# Patient Record
Sex: MALE | NOT HISPANIC OR LATINO | Employment: FULL TIME | URBAN - METROPOLITAN AREA
[De-identification: names, ages, dates, MRNs, and addresses within clinical notes are randomized per-mention and may not be internally consistent; named-entity substitution may affect disease eponyms.]

---

## 2021-03-25 DIAGNOSIS — Z23 ENCOUNTER FOR IMMUNIZATION: ICD-10-CM

## 2023-06-27 PROBLEM — F33.0 MILD EPISODE OF RECURRENT MAJOR DEPRESSIVE DISORDER (HCC): Status: ACTIVE | Noted: 2022-10-06

## 2023-06-27 PROBLEM — F41.1 GAD (GENERALIZED ANXIETY DISORDER): Status: ACTIVE | Noted: 2022-10-06

## 2025-06-12 ENCOUNTER — TELEPHONE (OUTPATIENT)
Age: 40
End: 2025-06-12

## 2025-06-12 NOTE — TELEPHONE ENCOUNTER
I received a Care Request from patient to schedule for:    First Name: Michael  Last Name: Jovany  Email: [email or web address]  Phone: [phone]  Subject: Request an appointment  Where does it hurt? Hip pain      I lvm to [facility] at [phone].

## 2025-06-16 ENCOUNTER — OFFICE VISIT (OUTPATIENT)
Age: 40
End: 2025-06-16
Payer: COMMERCIAL

## 2025-06-16 ENCOUNTER — APPOINTMENT (OUTPATIENT)
Age: 40
End: 2025-06-16
Attending: ORTHOPAEDIC SURGERY
Payer: COMMERCIAL

## 2025-06-16 DIAGNOSIS — M25.552 LEFT HIP PAIN: ICD-10-CM

## 2025-06-16 DIAGNOSIS — M25.551 RIGHT HIP PAIN: ICD-10-CM

## 2025-06-16 DIAGNOSIS — M16.11 ARTHRITIS OF RIGHT HIP: Primary | ICD-10-CM

## 2025-06-16 PROCEDURE — 99204 OFFICE O/P NEW MOD 45 MIN: CPT | Performed by: ORTHOPAEDIC SURGERY

## 2025-06-16 PROCEDURE — 73521 X-RAY EXAM HIPS BI 2 VIEWS: CPT

## 2025-06-16 RX ORDER — DICLOFENAC SODIUM 75 MG/1
75 TABLET, DELAYED RELEASE ORAL 2 TIMES DAILY
Qty: 40 TABLET | Refills: 0 | Status: SHIPPED | OUTPATIENT
Start: 2025-06-16

## 2025-06-16 NOTE — LETTER
June 16, 2025     Patient: Michael Manzo  YOB: 1985  Date of Visit: 6/16/2025      To Whom it May Concern:    Michael Manzo is under my professional care. Michael was seen in my office on 6/16/2025. Michael can continue work, should be allowed more comfortable shoes due to his hip pain, sneakers/running shoes. I also recommend breaks every 2 hours for 15 minutes to rest the hip if needed. These restrictions should remain in place through 9/30/25 since he will be returning early August to work.     If you have any questions or concerns, please don't hesitate to call.         Sincerely,          Luis Santiago MD        CC: No Recipients

## 2025-06-16 NOTE — PROGRESS NOTES
:  Assessment & Plan  Right hip pain         Arthritis of right hip    Orders:    Ambulatory Referral to Physical Therapy; Future    diclofenac (VOLTAREN) 75 mg EC tablet; Take 1 tablet (75 mg total) by mouth 2 (two) times a day    RIGHT worse than left hip pain and arthritis   Discussed treatment options  We reviewed the imaging details, does have moderate arthritis   He is taking ibuprofen with some relief    PT script   Diclofenac script, can try in place of ibuprofen  Follow up 8 weeks, we discussed return to work modifications at that time, he is out of work for summer vacation             REASON FOR VISIT:  Chief Complaint   Patient presents with    Right Hip - Pain    Left Hip - Pain         HISTORY OF PRESENT ILLNESS:  RIGHT worse than left hip pain      BILATERAL hip pain for several years, off and on       Then 2 weeks ago at work, he fell and landed on the RIGHT side  Felt sharp RIGHT hip pain      He walks a lot at work    Pain located mostly anterior RIGHT hip  Worse with external rotation   No groin pain but pain mainly anterior hip joint     Was on crutches initially     He walks a lot at work, and pain is mostly after walking    He tried ibuprofen with some benefit          Past Medical History[1]     Past Surgical History[2]    Social History     Socioeconomic History    Marital status: /Civil Union     Spouse name: Not on file    Number of children: Not on file    Years of education: Not on file    Highest education level: Not on file   Occupational History    Occupation: teacher   Tobacco Use    Smoking status: Former     Current packs/day: 0.00     Types: Cigarettes     Start date:      Quit date:      Years since quittin.4    Smokeless tobacco: Never   Substance and Sexual Activity    Alcohol use: Not Currently    Drug use: Not on file    Sexual activity: Not on file   Other Topics Concern    Not on file   Social History Narrative    Not on file     Social Drivers of Health      Financial Resource Strain: Low Risk  (5/18/2023)    Received from [facility]    Overall Financial Resource Strain (CARDIA)     Difficulty of Paying Living Expenses: Not very hard   Food Insecurity: Food Insecurity Present (5/18/2023)    Received from [facility]    Hunger Vital Sign     Within the past 12 months, you worried that your food would run out before you got the money to buy more.: Sometimes true     Within the past 12 months, the food you bought just didn't last and you didn't have money to get more.: Never true   Transportation Needs: No Transportation Needs (5/18/2023)    Received from [facility]    PRAPARE - Transportation     Lack of Transportation (Medical): No     Lack of Transportation (Non-Medical): No   Physical Activity: Not on file   Stress: Not on file   Social Connections: Moderately Isolated (5/18/2023)    Received from [facility]    Social Connection and Isolation Panel     In a typical week, how many times do you talk on the phone with family, friends, or neighbors?: Once a week     How often do you get together with friends or relatives?: Never     How often do you attend Druze or Restorationist services?: Never     Do you belong to any clubs or organizations such as Druze groups, unions, fraternal or athletic groups, or school groups?: Yes     How often do you attend meetings of the clubs or organizations you belong to?: 1 to 4 times per year     Are you , , , , never , or living with a partner?:    Intimate Partner Violence: Not on file   Housing Stability: Low Risk  (5/18/2023)    Received from [facility]    Housing Stability Vital Sign     Unable to Pay for Housing in the Last Year: No     Number of Places Lived in the Last Year: 1     Unstable Housing in the Last Year: No         MEDICATIONS:  Current  Medications[3]    ALLERGIES:  Allergies[4]      MAJOR FINDINGS:  Michael Manzo is pleasant, healthy appearing, and in no acute distress. Appropriate mood, affect  Circulatory: extremities wwp    RIGHT hip  FF 70   ER 20   IR 0  + pain with FADIR   Nontender over GT bursa   Sensation intact sp/dp/t  Strength intact 5/5 hip adductors/hip abductors/hip flexors   SLR intact  Extremity wwp      LEFT hip  FF 90   ER 20   IR 10  No pain with FADIR   Nontender over GT bursa   Sensation intact sp/dp/t  Strength intact 5/5 hip adductors/hip abductors/hip flexors   SLR intact  Extremity wwp    IMAGING  I personally reviewed and interpreted the imaging studies  X-rays performed on the BILATERAL hip show moderate arthritis worse on the RIGHT      CC:  LAITH TORRES Self, Referral         [1] No past medical history on file.  [2] No past surgical history on file.  [3]   Current Outpatient Medications:     Acetaminophen (TYLENOL PO), Take by mouth if needed, Disp: , Rfl:     ibuprofen (MOTRIN) 200 mg tablet, Take by mouth every 6 (six) hours as needed for mild pain, Disp: , Rfl:     Psyllium (METAMUCIL PO), Take by mouth if needed, Disp: , Rfl:     sodium picosulfate, magnesium oxide, citric acid (Clenpiq) oral solution, Take 175 mL by mouth see administration instructions Follow instructions given at office, Disp: 350 mL, Rfl: 0  [4] No Known Allergies

## 2025-06-23 NOTE — PROGRESS NOTES
PT Evaluation     Today's date: 2025  Patient name: Michael Manzo  : 1985  MRN: [id number]  Referring provider: Luis Santiago MD  Dx:   Encounter Diagnosis     ICD-10-CM    1. Acute pain of right hip  M25.551       2. Arthritis of right hip  M16.11 Ambulatory Referral to Physical Therapy          Start Time: 1215  Stop Time: 1300  Total time in clinic (min): 45 minutes    Assessment  Impairments: abnormal coordination, abnormal muscle firing, abnormal muscle tone, abnormal or restricted ROM, abnormal movement, activity intolerance, impaired physical strength, lacks appropriate home exercise program, pain with function, participation limitations, activity limitations and endurance  Symptom irritability: moderate    Assessment details: Michael Manzo is a pleasant 39 y.o. male who presents with right hip pain secondary to suspected diagnosis of right hip OA and 2nd to fall he sustained down he steps about 3 weeks ago. He currently demonstrates limitations with right hip ROM, NM control/endurance, balance, pain, and coordination. Functional impairments include difficulty with ADL's and running that he enjoys to do as a hobby. Educated patient on structural vs muscular impairments and different ways PT can address each. Will focus a lot on muscular and joint tightness, NM activation of hips and core muscles, and progressing towards return to running.  Based on listed impairments, Michael Manzo would benefit from formal physical therapy to address impairments as detailed, decrease pain, and restore maximal level of function for all home, work, and mobility tasks. All patient questions and concerns have been addressed at this time. Thank you for this referral.  Understanding of Dx/Px/POC: good     Prognosis: good    Goals  Short Term Goals:   1. Patient will be independent with initial customized HEP in 1 week  2. Patient will demonstrate improvement in R LE MMT scores > 4+/5 in 2-4 weeks.  3.  Patient will report a decrease in their symptoms by at least 2 points on VAS with in 2-4 weeks.  4. Patient will demonstrate improved SLS > 15sec in 2-4 weeks  5. Patient will report improved ability to navigate 2 flights of steps in 2-4 weeks    Long Term Goals:   1. Patient will improve FOTO to greater than goal of target goal in 6-8 weeks  2. Patient will eliminiate pain with activity in 6-8 weeks  3. Patient will continue with HEP independence to allow for decreased future reoccurrence of pain and loss in function in 6-8 weeks  4. Patient will demonstrate improvement with 30sec chair rise to > 20 in 6-8 weeks.   5. Patient will report ability to run > 1 mile with no issues by discharge.    Plan  Patient would benefit from: skilled physical therapy and PT eval  Planned modality interventions: low level laser therapy, cryotherapy, electrical stimulation/Russian stimulation, TENS and thermotherapy: hydrocollator packs    Planned therapy interventions: IASTM, joint mobilization, kinesiology taping, massage, manual therapy, Clifford taping, balance/weight bearing training, body mechanics training, neuromuscular re-education, nerve gliding, coordination, strengthening, stretching, therapeutic activities, therapeutic exercise, home exercise program, graded exercise and graded motor    Frequency: 1-2x week  Duration in weeks: 8  Plan of Care beginning date: 6/24/2025  Plan of Care expiration date: 8/19/2025  Treatment plan discussed with: patient  Plan details: Prognosis above is given PT services 2x/week tapering to 1x/week over the next 6-8 weeks and home program adherence.        Subjective Evaluation    History of Present Illness  Mechanism of injury: Patient is 39 y.o. male that presents to PT with CC of right hip pain after falling 2xs about 3 weeks ago. Reports that he has been feeling with bl hip pain R>L for the last 3 years and used to be an avid runner. Reports that he has not been able to run a lot since  "then. Patient reports the pain is reproduced with bringing his knee to his chest and having to put his shoes and socks on. Reports that he does get pain when he lays down at night, but improves after 10min. Reports that he went to ER after falling and got x-rays which showed no fractures but moderate arthritis on BL hips.. Patient's goals from PT include to reduce his pain and return to running again.        Quality of life: good    Patient Goals  Patient goals for therapy: decreased pain, improved balance, increased motion, increased strength, independence with ADLs/IADLs and return to sport/leisure activities    Pain  Current pain ratin  At best pain rating: 3  At worst pain ratin  Relieving factors: rest, support and medications  Aggravating factors: sitting, walking, lifting and stair climbing  Progression: worsening    Social Support  Steps to enter house: yes  Stairs in house: yes   Lives in: multiple-level home  Lives with: spouse and young children    Employment status: working (Summer break \"teacher\")    Diagnostic Tests  X-ray: abnormal        Objective     Tenderness     Left Hip   Tenderness in the ASIS.     Right Hip   Tenderness in the ASIS.     Neurological Testing     Sensation     Hip   Left Hip   Intact: light touch    Right Hip   Intact: light touch    Reflexes   Left   Patellar (L4): normal (2+)  Achilles (S1): normal (2+)    Right   Patellar (L4): normal (2+)  Achilles (S1): normal (2+)    Active Range of Motion   Left Hip   Flexion: 90 degrees with pain  External rotation (90/90): 40 degrees with pain  Internal rotation (prone): 10 degrees with pain    Right Hip   Flexion: 40 degrees with pain  External rotation (90/90): 35 degrees with pain  Internal rotation (prone): 5 degrees with pain    Passive Range of Motion   Left Hip   Flexion: 100 degrees with pain  External rotation (prone): 60 degrees with pain  Internal rotation (prone): 20 and prone pain degrees with pain    Right Hip " "  Flexion: 45 degrees with pain  External rotation (prone): 50 degrees with pain  Internal rotation (prone): 10 degrees with pain    Strength/Myotome Testing     Left Hip   Planes of Motion   Flexion: 4  Extension: 4-  Abduction: 4  Adduction: 4-  External rotation: 4-  Internal rotation: 4-    Isolated Muscles   Gluteus jewell: 4-  Gluteus medius: 4    Right Hip   Planes of Motion   Flexion: 4-  Extension: 4-  Abduction: 4  Adduction: 4-  External rotation: 4-  Internal rotation: 4-    Isolated Muscles   Gluteus maximums: 4-  Gluteus medius: 4    Tests     Left Hip   Positive REYMUNDO, FADIR and scour.     Right Hip   Positive REYMUNDO, FADIR and scour.     Functional Assessment      Squat    Trunk lean left and sitting toward left side.       Flowsheet Rows      Flowsheet Row Most Recent Value   PT/OT G-Codes    Current Score 47   Projected Score 67               Precautions: na  Manuals IE 6/23                     HIP ROM IR and ER in prone                      Hip mobs nv                     STM  Piriformis, QL                                             Neuro Re-Ed                       Clam 3x10  BTB ea                     STS 3x10 cues for weight shift                      MET 2x10x5\" hold                                                                                                                     Ther Ex                       Upright bike nv            SL leg press             Heel slides                      Prone extension 3x10 KE and KF ea                     Gastroc stretch                       Figure 4 stretch Nv to tolerance                      Adductor stretch nv  Kneeling on foam                     HF march  nv                                                                    Ther Activity                                                                       Gait Training                                                                       Modalities                                             "

## 2025-06-24 ENCOUNTER — EVALUATION (OUTPATIENT)
Dept: PHYSICAL THERAPY | Facility: CLINIC | Age: 40
End: 2025-06-24
Attending: ORTHOPAEDIC SURGERY
Payer: COMMERCIAL

## 2025-06-24 DIAGNOSIS — M16.11 ARTHRITIS OF RIGHT HIP: ICD-10-CM

## 2025-06-24 DIAGNOSIS — M25.551 ACUTE PAIN OF RIGHT HIP: Primary | ICD-10-CM

## 2025-06-24 PROCEDURE — 97110 THERAPEUTIC EXERCISES: CPT

## 2025-06-24 PROCEDURE — 97161 PT EVAL LOW COMPLEX 20 MIN: CPT

## 2025-06-24 NOTE — LETTER
2025    Luis Santiago MD  [address]  [place] [address]    Patient: Michael Manzo   YOB: 1985   Date of Visit: 2025     Encounter Diagnosis     ICD-10-CM    1. Acute pain of right hip  M25.551       2. Arthritis of right hip  M16.11 Ambulatory Referral to Physical Therapy          Dear Dr. Luis Santiago MD:    Thank you for your recent referral of Michael Manzo. Please review the attached evaluation summary from Michael's recent visit.     Please verify that you agree with the plan of care by signing the attached order.     If you have any questions or concerns, please do not hesitate to call.     I sincerely appreciate the opportunity to share in the care of one of your patients and hope to have another opportunity to work with you in the near future.       Sincerely,    Evin Shafer, PT      Referring Provider:      I certify that I have read the below Plan of Care and certify the need for these services furnished under this plan of treatment while under my care.                    Luis Santiago MD  [address]   [facility] [address]  [place] [facility] [address]  Via In Basket          PT Evaluation     Today's date: 2025  Patient name: Michael Manzo  : 1985  MRN: [id number]  Referring provider: Luis Santiago MD  Dx:   Encounter Diagnosis     ICD-10-CM    1. Acute pain of right hip  M25.551       2. Arthritis of right hip  M16.11 Ambulatory Referral to Physical Therapy          Start Time: 1215  Stop Time: 1300  Total time in clinic (min): 45 minutes    Assessment  Impairments: abnormal coordination, abnormal muscle firing, abnormal muscle tone, abnormal or restricted ROM, abnormal movement, activity intolerance, impaired physical strength, lacks appropriate home exercise program, pain with function, participation limitations, activity limitations and endurance  Symptom irritability: moderate    Assessment details: Michael Manzo is a pleasant 39  y.o. male who presents with right hip pain secondary to suspected diagnosis of right hip OA and 2nd to fall he sustained down he steps about 3 weeks ago. He currently demonstrates limitations with right hip ROM, NM control/endurance, balance, pain, and coordination. Functional impairments include difficulty with ADL's and running that he enjoys to do as a hobby. Educated patient on structural vs muscular impairments and different ways PT can address each. Will focus a lot on muscular and joint tightness, NM activation of hips and core muscles, and progressing towards return to running.  Based on listed impairments, Michael Manzo would benefit from formal physical therapy to address impairments as detailed, decrease pain, and restore maximal level of function for all home, work, and mobility tasks. All patient questions and concerns have been addressed at this time. Thank you for this referral.  Understanding of Dx/Px/POC: good     Prognosis: good    Goals  Short Term Goals:   1. Patient will be independent with initial customized HEP in 1 week  2. Patient will demonstrate improvement in R LE MMT scores > 4+/5 in 2-4 weeks.  3. Patient will report a decrease in their symptoms by at least 2 points on VAS with in 2-4 weeks.  4. Patient will demonstrate improved SLS > 15sec in 2-4 weeks  5. Patient will report improved ability to navigate 2 flights of steps in 2-4 weeks    Long Term Goals:   1. Patient will improve FOTO to greater than goal of target goal in 6-8 weeks  2. Patient will eliminiate pain with activity in 6-8 weeks  3. Patient will continue with HEP independence to allow for decreased future reoccurrence of pain and loss in function in 6-8 weeks  4. Patient will demonstrate improvement with 30sec chair rise to > 20 in 6-8 weeks.   5. Patient will report ability to run > 1 mile with no issues by discharge.    Plan  Patient would benefit from: skilled physical therapy and PT eval  Planned modality  interventions: low level laser therapy, cryotherapy, electrical stimulation/Russian stimulation, TENS and thermotherapy: hydrocollator packs    Planned therapy interventions: IASTM, joint mobilization, kinesiology taping, massage, manual therapy, Clifford taping, balance/weight bearing training, body mechanics training, neuromuscular re-education, nerve gliding, coordination, strengthening, stretching, therapeutic activities, therapeutic exercise, home exercise program, graded exercise and graded motor    Frequency: 1-2x week  Duration in weeks: 8  Plan of Care beginning date: 2025  Plan of Care expiration date: 2025  Treatment plan discussed with: patient  Plan details: Prognosis above is given PT services 2x/week tapering to 1x/week over the next 6-8 weeks and home program adherence.        Subjective Evaluation    History of Present Illness  Mechanism of injury: Patient is 39 y.o. male that presents to PT with CC of right hip pain after falling 2xs about 3 weeks ago. Reports that he has been feeling with bl hip pain R>L for the last 3 years and used to be an avid runner. Reports that he has not been able to run a lot since then. Patient reports the pain is reproduced with bringing his knee to his chest and having to put his shoes and socks on. Reports that he does get pain when he lays down at night, but improves after 10min. Reports that he went to ER after falling and got x-rays which showed no fractures but moderate arthritis on BL hips.. Patient's goals from PT include to reduce his pain and return to running again.        Quality of life: good    Patient Goals  Patient goals for therapy: decreased pain, improved balance, increased motion, increased strength, independence with ADLs/IADLs and return to sport/leisure activities    Pain  Current pain ratin  At best pain rating: 3  At worst pain ratin  Relieving factors: rest, support and medications  Aggravating factors: sitting, walking,  "lifting and stair climbing  Progression: worsening    Social Support  Steps to enter house: yes  Stairs in house: yes   Lives in: multiple-level home  Lives with: spouse and young children    Employment status: working (Summer break \"teacher\")    Diagnostic Tests  X-ray: abnormal        Objective     Tenderness     Left Hip   Tenderness in the ASIS.     Right Hip   Tenderness in the ASIS.     Neurological Testing     Sensation     Hip   Left Hip   Intact: light touch    Right Hip   Intact: light touch    Reflexes   Left   Patellar (L4): normal (2+)  Achilles (S1): normal (2+)    Right   Patellar (L4): normal (2+)  Achilles (S1): normal (2+)    Active Range of Motion   Left Hip   Flexion: 90 degrees with pain  External rotation (90/90): 40 degrees with pain  Internal rotation (prone): 10 degrees with pain    Right Hip   Flexion: 40 degrees with pain  External rotation (90/90): 35 degrees with pain  Internal rotation (prone): 5 degrees with pain    Passive Range of Motion   Left Hip   Flexion: 100 degrees with pain  External rotation (prone): 60 degrees with pain  Internal rotation (prone): 20 and prone pain degrees with pain    Right Hip   Flexion: 45 degrees with pain  External rotation (prone): 50 degrees with pain  Internal rotation (prone): 10 degrees with pain    Strength/Myotome Testing     Left Hip   Planes of Motion   Flexion: 4  Extension: 4-  Abduction: 4  Adduction: 4-  External rotation: 4-  Internal rotation: 4-    Isolated Muscles   Gluteus jewell: 4-  Gluteus medius: 4    Right Hip   Planes of Motion   Flexion: 4-  Extension: 4-  Abduction: 4  Adduction: 4-  External rotation: 4-  Internal rotation: 4-    Isolated Muscles   Gluteus maximums: 4-  Gluteus medius: 4    Tests     Left Hip   Positive REYMUNDO, FADIR and scour.     Right Hip   Positive REYMUNDO, FADIR and scour.     Functional Assessment      Squat    Trunk lean left and sitting toward left side.       Flowsheet Rows      Flowsheet Row Most " "Recent Value   PT/OT G-Codes    Current Score 47   Projected Score 67               Precautions: na  Manuals IE 6/23                     HIP ROM IR and ER in prone                      Hip mobs nv                     STM  Piriformis, QL                                             Neuro Re-Ed                       Clam 3x10  BTB ea                     STS 3x10 cues for weight shift                      MET 2x10x5\" hold                                                                                                                     Ther Ex                       Upright bike nv            SL leg press             Heel slides                      Prone extension 3x10 KE and KF ea                     Gastroc stretch                       Figure 4 stretch Nv to tolerance                      Adductor stretch nv  Kneeling on foam                     HF march  nv                                                                    Ther Activity                                                                       Gait Training                                                                       Modalities                                                                                                "

## 2025-06-24 NOTE — HOME EXERCISE EDUCATION
Program_ID:[id number]   Access Code: [email or web address]  URL: [email or web address]  Date: 06-  Prepared By: Evin Shafer    Program Notes      Exercises      - Prone Hip Extension - 1 x daily - 7 x weekly - 3 sets - 10 reps      - Prone Hip Extension with Bent Knee - 1 x daily - 7 x weekly - 3 sets - 10 reps      - Clamshell with Resistance - 1 x daily - 7 x weekly - 3 sets - 10 reps      - Supine SI Joint Self-Correction - 1 x daily - 7 x weekly - 2 sets - 10 reps - 5sec hold      - Squat with Chair Touch - 1 x daily - 7 x weekly - 3 sets - 10 reps

## 2025-06-26 ENCOUNTER — APPOINTMENT (OUTPATIENT)
Dept: PHYSICAL THERAPY | Facility: CLINIC | Age: 40
End: 2025-06-26
Attending: ORTHOPAEDIC SURGERY
Payer: COMMERCIAL

## 2025-06-30 NOTE — PROGRESS NOTES
"Daily Note     Today's date: 2025  Patient name: Michael Manzo  : 1985  MRN: [id number]  Referring provider: Luis Santiago MD  Dx:   Encounter Diagnosis     ICD-10-CM    1. Arthritis of right hip  M16.11       2. Acute pain of right hip  M25.551           Start Time: 1030  Stop Time: 1116  Total time in clinic (min): 46 minutes    Subjective: Patient reports that he has been working on HEP. Reports that he did try to run and had some discomfort R>L but nothing that limited him.       Objective: See treatment diary below      Assessment: Tolerated treatment well. Responded well to mobilization, LAD and HF stretching. Continues to have the most limitation into flexion ROM R>L. Challenged with SL stability and marching today. demonstrated fatigue post treatment, exhibited good technique with therapeutic exercises, and would benefit from continued PT      Plan: Continue per plan of care.      Precautions: na  Manuals IE                    HIP ROM IR and ER in prone  IR and ER in prone                    Hip mobs nv  Lat/inf   Gr3-Gr4                    LAD  HB           STM  Piriformis, QL  Theragun  1900hz    Piriformis, QL, ERors                                           Neuro Re-Ed                       Clam 3x10  BTB ea  Standing  2x10 BTB                    STS 3x10 cues for weight shift   HEP                   MET 2x10x5\" hold  HEP                   SL bridge   2x8 ea                    Marching    3x10  #20kb                   KB march                                               Ther Ex                       TM nv 5' bloodflow           SL leg press             HF stretch  Half kneel   3x30\"                     Prone extension 3x10 KE and KF ea  HEP                   Gastroc stretch                       Figure 4 stretch Nv to tolerance                      Adductor stretch nv  Kneeling on foam                     HF march   nv                                                           "        Ther Activity                                                                       Gait Training                                                                       Modalities

## 2025-07-01 ENCOUNTER — OFFICE VISIT (OUTPATIENT)
Dept: PHYSICAL THERAPY | Facility: CLINIC | Age: 40
End: 2025-07-01
Attending: ORTHOPAEDIC SURGERY
Payer: COMMERCIAL

## 2025-07-01 DIAGNOSIS — M16.11 ARTHRITIS OF RIGHT HIP: Primary | ICD-10-CM

## 2025-07-01 DIAGNOSIS — M25.551 ACUTE PAIN OF RIGHT HIP: ICD-10-CM

## 2025-07-01 PROCEDURE — 97140 MANUAL THERAPY 1/> REGIONS: CPT

## 2025-07-01 PROCEDURE — 97110 THERAPEUTIC EXERCISES: CPT

## 2025-07-01 PROCEDURE — 97112 NEUROMUSCULAR REEDUCATION: CPT

## 2025-07-01 NOTE — HOME EXERCISE EDUCATION
Program_ID:[id number]   Access Code: [email or web address]  URL: [email or web address]  Date: 07-  Prepared By: Evin Shafer    Program Notes      Exercises      - Prone Hip Extension - 1 x daily - 7 x weekly - 3 sets - 10 reps      - Prone Hip Extension with Bent Knee - 1 x daily - 7 x weekly - 3 sets - 10 reps      - Clamshell with Resistance - 1 x daily - 7 x weekly - 3 sets - 10 reps      - Supine SI Joint Self-Correction - 1 x daily - 7 x weekly - 2 sets - 10 reps - 5sec hold      - Squat with Chair Touch - 1 x daily - 7 x weekly - 3 sets - 10 reps      - Half Kneeling Hip Flexor Stretch - 1 x daily - 7 x weekly - 3 sets -  reps - 30sec hold

## 2025-07-03 ENCOUNTER — OFFICE VISIT (OUTPATIENT)
Dept: PHYSICAL THERAPY | Facility: CLINIC | Age: 40
End: 2025-07-03
Attending: ORTHOPAEDIC SURGERY
Payer: COMMERCIAL

## 2025-07-03 DIAGNOSIS — M25.551 ACUTE PAIN OF RIGHT HIP: ICD-10-CM

## 2025-07-03 DIAGNOSIS — M16.11 ARTHRITIS OF RIGHT HIP: Primary | ICD-10-CM

## 2025-07-03 PROCEDURE — 97110 THERAPEUTIC EXERCISES: CPT

## 2025-07-03 PROCEDURE — 97140 MANUAL THERAPY 1/> REGIONS: CPT

## 2025-07-03 PROCEDURE — 97112 NEUROMUSCULAR REEDUCATION: CPT

## 2025-07-03 NOTE — HOME EXERCISE EDUCATION
Program_ID:[id number]   Access Code: [email or web address]  URL: [email or web address]  Date: 07-  Prepared By: Evin Shafer    Program Notes      Exercises      - Prone Hip Extension - 1 x daily - 7 x weekly - 3 sets - 10 reps      - Prone Hip Extension with Bent Knee - 1 x daily - 7 x weekly - 3 sets - 10 reps      - Clamshell with Resistance - 1 x daily - 7 x weekly - 3 sets - 10 reps      - Supine SI Joint Self-Correction - 1 x daily - 7 x weekly - 2 sets - 10 reps - 5sec hold      - Squat with Chair Touch - 1 x daily - 7 x weekly - 3 sets - 10 reps      - Half Kneeling Hip Flexor Stretch - 1 x daily - 7 x weekly - 3 sets -  reps - 30sec hold      - Standing Clam with Resistance Loop - 1 x daily - 7 x weekly - 3 sets - 10 reps      - Standing Clam with Resistance Loop - 1 x daily - 7 x weekly - 3 sets - 10 reps      - Marching with Resistance - 1 x daily - 7 x weekly - 2 sets - 10 reps

## 2025-07-03 NOTE — PROGRESS NOTES
"Daily Note     Today's date: 7/3/2025  Patient name: Michael Manzo  : 1985  MRN: [id number]  Referring provider: Luis Santiago MD  Dx:   Encounter Diagnosis     ICD-10-CM    1. Arthritis of right hip  M16.11       2. Acute pain of right hip  M25.551         Start Time: 1450  Stop Time: 1535  Total time in clinic (min): 45 minutes    Subjective: Patient reports that he has been working on HEP and HF stretching. Reports that he did have some soreness from his lv, but feels a lot better today. Reports some improvement in hip tightness.       Objective: See treatment diary below      Assessment: Tolerated treatment well. Continues to respond well to mobilization, LAD and HF stretching. Continues to have the most limitation into flexion ROM R>L. Challenged with SL stability and marching today. Attempted kneeling adductor stretch, although this was very uncomfortable and could not tolerate it. He demonstrated fatigue post treatment, exhibited good technique with therapeutic exercises, and would benefit from continued PT      Plan: Continue per plan of care.      Precautions: na  Manuals IE 6/23  7/1  7/3                 HIP ROM IR and ER in prone  IR and ER in prone  IR and ER in prone                 Hip mobs nv  Lat/inf   Gr3-Gr4   Lat/inf   Gr3-Gr4                  LAD  HB HB          STM  Piriformis, QL  Theragun  1900hz    Piriformis, QL, ERors  Theragun  1900hz    Piriformis, QL, ERors                                         Neuro Re-Ed                       Clam 3x10  BTB ea  Standing  2x10 BTB   Standing at wall  2x10 BTB                  STS 3x10 cues for weight shift   HEP                   MET 2x10x5\" hold  HEP                   SL bridge   2x8 ea   2x8 ea                 Marching    3x10  #20kb  3x10  #20kb                 KB march                                               Ther Ex                       TM nv 5' bloodflow 5' bloodflow          SL leg press             HF stretch  Half kneel " "  3x30\"    Half kneel   3x30\"                 Prone extension 3x10 KE and KF ea  HEP                   Gastroc stretch                       Figure 4 stretch Nv to tolerance                      Adductor stretch nv  Kneeling on foam                     HF march   nv  Kb 5#  2x10                                                                Ther Activity                                                                       Gait Training                                                                       Modalities                                                                                "

## 2025-07-08 ENCOUNTER — OFFICE VISIT (OUTPATIENT)
Dept: PHYSICAL THERAPY | Facility: CLINIC | Age: 40
End: 2025-07-08
Attending: ORTHOPAEDIC SURGERY
Payer: COMMERCIAL

## 2025-07-08 DIAGNOSIS — M16.11 ARTHRITIS OF RIGHT HIP: Primary | ICD-10-CM

## 2025-07-08 DIAGNOSIS — M25.551 ACUTE PAIN OF RIGHT HIP: ICD-10-CM

## 2025-07-08 PROCEDURE — 97112 NEUROMUSCULAR REEDUCATION: CPT

## 2025-07-08 PROCEDURE — 97140 MANUAL THERAPY 1/> REGIONS: CPT

## 2025-07-08 NOTE — PROGRESS NOTES
"Daily Note     Today's date: 2025  Patient name: Michael Manzo  : 1985  MRN: [id number]  Referring provider: Luis Santiago MD  Dx:   Encounter Diagnosis     ICD-10-CM    1. Arthritis of right hip  M16.11       2. Acute pain of right hip  M25.551           Start Time: 0945  Stop Time: 1030  Total time in clinic (min): 45 minutes    Subjective: Patient reports that he continues to work on HEP and seeing improvement on his hip. Reports that he does wake up and feels stiff the most and gets loser as he walks around.       Objective: See treatment diary below      Assessment: Tolerated treatment well. Continues to respond well to mobilization, LAD and HF stretching. Continues to have the most limitation into flexion ROM R>L. Challenged with SL stability and marching today. Attempted kneeling adductor stretch, although this was very uncomfortable and could not tolerate it. He demonstrated fatigue post treatment, exhibited good technique with therapeutic exercises, and would benefit from continued PT      Plan: Continue per plan of care.      Precautions: na  Manuals IE 6/23  7/1  7/3  7/8               HIP ROM IR and ER in prone  IR and ER in prone  IR and ER in prone  R and ER in prone               Hip mobs nv  Lat/inf   Gr3-Gr4   Lat/inf   Gr3-Gr4   Lat/inf   Gr3-Gr4                LAD  HB HB HB         STM  Piriformis, QL  Theragun  1900hz    Piriformis, QL, ERors  Theragun  1900hz    Piriformis, QL, ERors  Theragun  1900hz    Piriformis, QL, ERors               HF stretching        BL Eric stretch off side of bed    3x30\" ea               Neuro Re-Ed                       Clam 3x10  BTB ea  Standing  2x10 BTB   Standing at wall  2x10 BTB   Standing at wall  3x10 BTB                STS 3x10 cues for weight shift   HEP                   MET 2x10x5\" hold  HEP                   SL bridge   2x8 ea   2x8 ea  2x8 ea               Marching    3x10  #20kb  3x10  #20kb  nv               KB around the worlds " "       SL 2x5 CW/CCW                                       Ther Ex                       TM nv 5' bloodflow 5' bloodflow 5' bloodlfow         SL leg press             HF stretch  Half kneel   3x30\"    Half kneel   3x30\"  HEP               Prone extension 3x10 KE and KF ea  HEP                   Gastroc stretch                       Figure 4 stretch Nv to tolerance                      Adductor stretch nv  Kneeling on foam                     HF march   nv  Kb 5#  2x10  nv                                                              Ther Activity                                                                       Gait Training                                                                       Modalities                                                                                "

## 2025-07-11 ENCOUNTER — APPOINTMENT (OUTPATIENT)
Dept: PHYSICAL THERAPY | Facility: CLINIC | Age: 40
End: 2025-07-11
Attending: ORTHOPAEDIC SURGERY
Payer: COMMERCIAL